# Patient Record
Sex: FEMALE | Race: WHITE
[De-identification: names, ages, dates, MRNs, and addresses within clinical notes are randomized per-mention and may not be internally consistent; named-entity substitution may affect disease eponyms.]

---

## 2023-08-17 ENCOUNTER — HOSPITAL ENCOUNTER (OUTPATIENT)
Dept: HOSPITAL 75 - LAB | Age: 28
LOS: 14 days | Discharge: HOME | End: 2023-08-31
Attending: SURGERY
Payer: MEDICAID

## 2023-08-17 DIAGNOSIS — R19.7: Primary | ICD-10-CM

## 2023-08-17 PROCEDURE — 87046 STOOL CULTR AEROBIC BACT EA: CPT

## 2023-08-17 PROCEDURE — 87324 CLOSTRIDIUM AG IA: CPT

## 2023-08-17 PROCEDURE — 87329 GIARDIA AG IA: CPT

## 2023-08-17 PROCEDURE — 87015 SPECIMEN INFECT AGNT CONCNTJ: CPT

## 2023-08-17 PROCEDURE — 87045 FECES CULTURE AEROBIC BACT: CPT

## 2023-08-17 PROCEDURE — 87449 NOS EACH ORGANISM AG IA: CPT

## 2023-08-17 PROCEDURE — 87328 CRYPTOSPORIDIUM AG IA: CPT

## 2023-08-17 PROCEDURE — 87899 AGENT NOS ASSAY W/OPTIC: CPT

## 2023-08-23 ENCOUNTER — HOSPITAL ENCOUNTER (OUTPATIENT)
Dept: HOSPITAL 75 - PREOP | Age: 28
Discharge: HOME | End: 2023-08-23
Attending: SURGERY
Payer: MEDICAID

## 2023-08-23 VITALS — WEIGHT: 197.09 LBS | BODY MASS INDEX: 38.69 KG/M2 | HEIGHT: 60 IN

## 2023-08-23 DIAGNOSIS — Z01.818: Primary | ICD-10-CM

## 2023-08-28 ENCOUNTER — HOSPITAL ENCOUNTER (OUTPATIENT)
Dept: HOSPITAL 75 - ENDO | Age: 28
Discharge: HOME | End: 2023-08-28
Attending: SURGERY
Payer: MEDICAID

## 2023-08-28 VITALS — SYSTOLIC BLOOD PRESSURE: 138 MMHG | DIASTOLIC BLOOD PRESSURE: 66 MMHG

## 2023-08-28 VITALS — BODY MASS INDEX: 38.69 KG/M2 | WEIGHT: 197.09 LBS | HEIGHT: 59.84 IN

## 2023-08-28 VITALS — SYSTOLIC BLOOD PRESSURE: 140 MMHG | DIASTOLIC BLOOD PRESSURE: 69 MMHG

## 2023-08-28 VITALS — SYSTOLIC BLOOD PRESSURE: 133 MMHG | DIASTOLIC BLOOD PRESSURE: 96 MMHG

## 2023-08-28 VITALS — DIASTOLIC BLOOD PRESSURE: 64 MMHG | SYSTOLIC BLOOD PRESSURE: 128 MMHG

## 2023-08-28 DIAGNOSIS — K21.00: ICD-10-CM

## 2023-08-28 DIAGNOSIS — F17.290: ICD-10-CM

## 2023-08-28 DIAGNOSIS — F17.210: ICD-10-CM

## 2023-08-28 DIAGNOSIS — E66.9: ICD-10-CM

## 2023-08-28 DIAGNOSIS — K29.70: ICD-10-CM

## 2023-08-28 DIAGNOSIS — K52.9: ICD-10-CM

## 2023-08-28 DIAGNOSIS — K62.1: Primary | ICD-10-CM

## 2023-08-28 DIAGNOSIS — K64.8: ICD-10-CM

## 2023-08-28 DIAGNOSIS — K44.9: ICD-10-CM

## 2023-08-28 DIAGNOSIS — K29.80: ICD-10-CM

## 2023-08-28 PROCEDURE — 84703 CHORIONIC GONADOTROPIN ASSAY: CPT

## 2023-08-28 PROCEDURE — 88305 TISSUE EXAM BY PATHOLOGIST: CPT

## 2023-08-28 NOTE — ANESTHESIA-GENERAL POST-OP
MAC


Patient Condition


Mental Status/LOC:  Same as Preop


Cardiovascular:  Satisfactory


Nausea/Vomiting:  Absent


Respiratory:  Satisfactory


Pain:  Controlled


Complications:  Absent





Post Op Complications


Complications


None





Follow Up Care/Instructions


Patient Instructions


None needed.





Anesthesiology Discharge Order


Discharge Order


Patient is doing well, no complaints, stable vital signs, no apparent adverse 

anesthesia problems.   


No complications reported per nursing.











SUSI THORPE CRNA          Aug 28, 2023 13:17

## 2023-08-28 NOTE — PROGRESS NOTE-PRE OPERATIVE
Pre-Operative Progress Note


Date of Available H&P:  Aug 17, 2023


Date H&P Reviewed:  Aug 28, 2023


Time H&P Reviewed:  12:17


History & Physical:  H&P Reviewed, Patient Examed, No changes noted


Pre-Operative Diagnosis:  Abdominal pain, nausea, change in bowel habits











THEODORE BRAVO DO               Aug 28, 2023 12:26

## 2023-08-28 NOTE — ENDOSCOPY DISCHARGE INSTRUCT
Endo Procedure/Findings


Findings


1.:  Gastritis


2.:  Hiatal Hernia, Other Findings (duodenitis)


3.:  Polyp


4.:  Internal Hemorrhoids





Discharge Instructions


-


Activity: You might feel a little sleepy until tomorrow.  This is due to the 

medicine you received to relax you.





Until tomorrow, you should:  


   NOT drive a car, operate machinery or power tools.


   NOT drink any alcoholic beverages.


   NOT make any important decisions or sign importortant papers.





Do not return to work until tomorrow, unless otherwise instructed. Resume 

previous activities tomorrow.





Diet: Start by taking liquids.  If you tolerate liquids, advance to solid food.


1.:  EGD in 1 year


2.:  Colonscopy in 5 years





Notify Physician


-


If you experience excessive bleeding, unusual abdominal pain, fever, or chest 

pain, contact your doctor immediately.





Follow-Up:


Other Follow up


in my office in a week











THEODORE BRAVO DO               Aug 28, 2023 12:52

## 2023-08-28 NOTE — PROGRESS NOTE-POST OPERATIVE
Post-Operative Progess Note


Surgeon (s)/Assistant (s)


Surgeon


THEODORE BRAVO DO


Assistant:  JUANA Noble





Pre-Operative Diagnosis


Abdominal pain, nausea, change in bowel habits





Post-Operative Diagnosis





Duodenitis


Gastritis


Hiatal  hernia


Polyp


Int hemorrhoid





Procedure & Operative Findings


Date of Procedure


8/28/23


Procedure Performed/Findings


EGD with biopsy


Colonoscopy with hot biopsy


Colonoscopy with cold biopsy





PROCEDURE NOTE:


After informed consent was obtained, the patient was brought to the endoscopy


suite, placed in bed in left lateral decubitus position.  She was administered


IV sedation by the CRNA who then monitored  vitals the entire time, heart


rate, blood pressure and pulse ox and the scope was inserted down the mouth


through the esophagus into the stomach.  On the way down, noted some mild


esophagitis, took a picture, pushed into the stomach, pushed past the antrum


into the duodenum.  Duodenum had inflammation and what looked like small


ulcers; did a biopsy here.  Pulled back, noted some gastritis and did a biopsy 

of


the antrum, then retroflexed the scope, saw small Grade II AFS hiatal hernia, 


took a picture of this and then pulled the scope into the GE junction, took 


another picture of the hiatal hernia and then did a biopsy of the GE junction. 


Pushed the scope back into the stomach, suctioned all the air out of the 


stomach. At this point pulled the scope up the esophagus and out the mouth.  


Switched camera, switched gloves, went down below and started the colonoscopy.  


Pushed all the way to about 130 cm and pushed into the cecum, took a picture 


of appendiceal orifice  and noted the ileocecal valve. Able to get into the 

terminal


ileum and did a cold biopsy. Then slowly withdrew the scope insufflating to look




circumferentially at the walls starting in the cecum, up the ascending colon to 

the 


hepatic flexure, then down the transverse colon, splenic flexure, into the 

descending 


colon down in the sigmoid and then into the rectum.  Found a small polyp here, 


took a picture and then able to remove it completely with hot biopsy. Finally, 


in the rectal vault I retroflexed the scope. Took a picture of the internal 

hemorrhoids.  





The patient tolerated the procedure and she recovered in the endoscopy suite.





Recommended for repeat colonoscopy in 5 years


Anesthesia Type


IV sedation by CRNA





Estimated Blood Loss


Estimated blood loss (mL):  scant





Specimens/Packing


Specimens Removed


duodenal bx


antral bx


body of stomach bx


GE junction bx


Terminal ileum bx


Rectal polyp











THEODORE BRAVO DO               Aug 28, 2023 12:51